# Patient Record
Sex: MALE | Race: WHITE
[De-identification: names, ages, dates, MRNs, and addresses within clinical notes are randomized per-mention and may not be internally consistent; named-entity substitution may affect disease eponyms.]

---

## 2020-04-30 ENCOUNTER — HOSPITAL ENCOUNTER (OUTPATIENT)
Dept: HOSPITAL 47 - RADECHMAIN | Age: 50
Discharge: HOME | End: 2020-04-30
Attending: FAMILY MEDICINE
Payer: COMMERCIAL

## 2020-04-30 DIAGNOSIS — I27.20: ICD-10-CM

## 2020-04-30 DIAGNOSIS — I07.1: Primary | ICD-10-CM

## 2020-04-30 DIAGNOSIS — R06.00: ICD-10-CM

## 2020-04-30 PROCEDURE — 93017 CV STRESS TEST TRACING ONLY: CPT

## 2020-04-30 PROCEDURE — 93306 TTE W/DOPPLER COMPLETE: CPT

## 2020-04-30 NOTE — P.STRESS
- Stress Test Note


Stress Test Results/Findings: 


Exam Performed:  stress test


Exam Date: 04/30/20


Reason for Exam: DYSPNEA





Height: 5 ft 8 in


Weight: 100 kg


Protocol: NASH


Stage: 2


Duration of Exercise: 6:00





Resting Heart Rate: 78


Resting Blood Pressure: 149/103


Maximum Achieved Heart Rate: 153


Maximum Achieved Blood Pressure: 216/88


85% PMHR: 146


100% PMHR: 172


METS: 7.1


Technologist Comment: 





Stress Test Results/Findings: 


This is a 50-year-old gentleman with history of hypertension, COPD, being 

evaluated for shortness of breath.





Stress data: Baseline EKG showed sinus rhythm with normal MO interval and QRS 

duration.  Blood pressure at rest is 149/103 with pulse rate of 78.  Patient 

walked on a Nash protocol for 6 minutes achieving a maximum heart rate of 153 

with a blood pressure of 216/88.  EKGs taken during and after exercise did not 

reveal any significant changes from baseline.





Final impression: #1.  Limited exercise capacity #2 negative stress test.  #3.  

Patient did not experience any chest pain .  #4.  No arrhythmias detected.

## 2020-04-30 NOTE — ECHOF
Referral Reason:R06.00 Dyspnea, unspecified



MEASUREMENTS

--------

HEIGHT: 172.7 cm

WEIGHT: 97.5 kg

BP: 

RVIDd:   3.9 cm     (< 3.3)

IVSd:   1.7 cm     (0.6 - 1.1)

LVIDd:   4.0 cm     (3.9 - 5.3)

LVPWd:   1.7 cm     (0.6 - 1.1)

IVSs:   2.5 cm

LVIDs:   2.4 cm

LVPWs:   2.3 cm

LAESV Index (A-L):   26.40 ml/m

Ao Diam:   3.4 cm     (2.0 - 3.7)

AV Cusp:   2.4 cm     (1.5 - 2.6)

MV EXCURSION:   15.488 mm     (> 18.000)

MV EF SLOPE:   52 mm/s     (70 - 150)

EPSS:   0.1 cm

MV E Gal:   0.76 m/s

MV DecT:   217 ms

MV A Gal:   0.66 m/s

MV E/A Ratio:   1.14 

RAP:   5.00 mmHg

RVSP:   35.33 mmHg







FINDINGS

--------

Sinus rhythm.

This was a technically adequate study.

The left ventricular size is normal.   There is moderate concentric left ventricular hypertrophy.   O
verall left ventricular systolic function is normal with, an EF between 60 - 65 %.   The diastolic fi
lling pattern is normal for the age of the patient 12.02.

The right ventricle is moderately enlarged.

Normal LA  size by volume 22+/-6 ml/m2.

The right atrium is mildly enlarged.

Interatrial and interventricular septum intact.

The aortic valve is trileaflet and appears structurally normal.   There is mild aortic valve sclerosi
s.   There is no evidence of aortic regurgitation.   There is no evidence of aortic stenosis.

There is trace mitral regurgitation.

Mild tricuspid regurgitation present.   There is borderline pulmonary hypertension.   The right ventr
icular systolic pressure, as measured by Doppler, is 35.33mmHg.

There is no pulmonic regurgitation present.

The aortic root size is normal.

IVC Not well visulized.

There is no pericardial effusion.



CONCLUSIONS

--------

1. Sinus rhythm.

2. This was a technically adequate study.

3. The left ventricular size is normal.

4. There is moderate concentric left ventricular hypertrophy.

5. Overall left ventricular systolic function is normal with, an EF between 60 - 65 %.

6. The diastolic filling pattern is normal for the age of the patient 12.02

7. The right ventricle is moderately enlarged.

8. Normal LA size by volume 22+/-6 ml/m2.

9. The right atrium is mildly enlarged.

10. Interatrial and interventricular septum intact.

11. The aortic valve is trileaflet and appears structurally normal.

12. There is mild aortic valve sclerosis.

13. There is no evidence of aortic regurgitation.

14. There is no evidence of aortic stenosis.

15. There is trace mitral regurgitation.

16. Mild tricuspid regurgitation present.

17. There is borderline pulmonary hypertension.

18. The right ventricular systolic pressure, as measured by Doppler, is 35.33mmHg.

19. There is no pulmonic regurgitation present.

20. The aortic root size is normal.

21. IVC Not well visulized.

22. There is no pericardial effusion.





SONOGRAPHER: Gena Morales RDCS

## 2020-08-10 ENCOUNTER — HOSPITAL ENCOUNTER (OUTPATIENT)
Dept: HOSPITAL 47 - RADUSWWP | Age: 50
Discharge: HOME | End: 2020-08-10
Attending: FAMILY MEDICINE
Payer: COMMERCIAL

## 2020-08-10 DIAGNOSIS — R97.20: Primary | ICD-10-CM

## 2020-08-10 PROCEDURE — 76872 US TRANSRECTAL: CPT

## 2020-08-10 NOTE — US
EXAMINATION TYPE: US prostate transrectal

 

DATE OF EXAM: 8/10/2020

 

COMPARISON: NONE

 

CLINICAL HISTORY: R97.20 Raised PSA. Elevated PSA, pt also states urinary frequency at night, and dif
ficulty emptying bladder

 

This examination was performed using the transrectal probe. 

 

EXAM MEASUREMENTS:

 

Gland Size: 4.8 x 2.9 x 4.1 cm

Volume: 29.7 ml

Predicted PSA:  3.6

Actual PSA (if available):7.6

 

**Predicted PSA wnl (much lower than actual PSA), no peripheral lesion could be appreciated

 

Seminal vesicles are unremarkable initially. Prostate gland measures upper limits of normal in size w
ith some central calcifications. No suspicious hypoechoic nodules identified.

 

IMPRESSION:  As above. Actual PSA out of proportion to predicted PSA. Advise urology referral. Consid
er ultrasound guided random sampling or MRI evaluation.

 

 

Predicted PSA = volume x 0.12 ng/ml

Calculated Volume = 0.5236 x L x W x H

## 2020-10-07 ENCOUNTER — HOSPITAL ENCOUNTER (EMERGENCY)
Dept: HOSPITAL 47 - EC | Age: 50
Discharge: HOME | End: 2020-10-07
Payer: COMMERCIAL

## 2020-10-07 VITALS — HEART RATE: 86 BPM | SYSTOLIC BLOOD PRESSURE: 132 MMHG | DIASTOLIC BLOOD PRESSURE: 76 MMHG | RESPIRATION RATE: 16 BRPM

## 2020-10-07 VITALS — TEMPERATURE: 98.1 F

## 2020-10-07 DIAGNOSIS — Z87.891: ICD-10-CM
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
## 2020-10-07 DIAGNOSIS — S81.811A: Primary | ICD-10-CM

## 2020-10-07 PROCEDURE — 99282 EMERGENCY DEPT VISIT SF MDM: CPT

## 2020-10-07 PROCEDURE — 12002 RPR S/N/AX/GEN/TRNK2.6-7.5CM: CPT

## 2020-10-07 NOTE — ED
Wound/Laceration HPI





- General


Chief Complaint: Wound/Laceration


Stated Complaint: leg lac


Time Seen by Provider: 10/07/20 13:31


Source: patient


Mode of arrival: ambulatory


Limitations: no limitations





- History of Present Illness


Initial Comments: 





Patient is a 42-year-old male presenting to the emergency department with chief 

complaint of laceration.  Patient states that he accidentally lacerated the 

lateral aspect of his right leg using a knife.  States his tetanus is 

up-to-date.  No blood thinners.  States the pain is minimal.  This occurred 

about one hour prior to arrival.  No alleviating or aggravating factors.  No 

numbness or tingling.





- Related Data


                                    Allergies











Allergy/AdvReac Type Severity Reaction Status Date / Time


 


No Known Allergies Allergy   Verified 10/07/20 13:26














Review of Systems


ROS Statement: 


Those systems with pertinent positive or pertinent negative responses have been 

documented in the HPI.





ROS Other: All systems not noted in ROS Statement are negative.





Past Medical History


Past Medical History: No Reported History


History of Any Multi-Drug Resistant Organisms: None Reported


Past Surgical History: Orthopedic Surgery


Additional Past Surgical History / Comment(s): rt shoulder


Past Psychological History: No Psychological Hx Reported


Smoking Status: Former smoker


Past Alcohol Use History: Occasional


Past Drug Use History: None Reported





General Exam


Limitations: no limitations


General appearance: alert, in no apparent distress


Head exam: Present: atraumatic, normocephalic, normal inspection


Eye exam: Present: normal appearance, PERRL, EOMI


Pupils: Present: normal accommodation


ENT exam: Present: normal exam, normal oropharynx, mucous membranes moist


Neck exam: Present: normal inspection, full ROM


Respiratory exam: Present: normal lung sounds bilaterally.  Absent: respiratory 

distress, wheezes


Cardiovascular Exam: Present: regular rate, normal rhythm, normal heart sounds


Extremities exam: Present: full ROM, normal capillary refill, other (+2 ulnar 

and radial pulses bilateral.  +2 dorsalis pedis and posterior tibials 

bilateral.).  Absent: normal inspection (5 similar laceration on the lateral 

aspect of her right lower extremity.), tenderness, pedal edema, joint swelling, 

calf tenderness


Back exam: Present: normal inspection, full ROM.  Absent: tenderness, CVA 

tenderness (R), CVA tenderness (L)


Neurological exam: Present: alert, oriented X3, normal gait


Psychiatric exam: Present: normal affect, normal mood


Skin exam: Present: warm, dry, intact, normal color





Course


                                   Vital Signs











  10/07/20 10/07/20





  13:23 14:32


 


Temperature 98.1 F 


 


Pulse Rate 83 86


 


Respiratory 18 16





Rate  


 


Blood Pressure 114/77 132/76


 


O2 Sat by Pulse 98 99





Oximetry  














Procedures





- Laceration


  ** Laceration #1


Consent Obtained: verbal consent


Indication: laceration


Site: lower extremity (Right leg)


Size (cm): 5


Description: linear


Depth: simple, single layer


Sedation/Analgesia: none


Anesthetic Used: lidocaine 1%


Anesthesia Technique: local infiltration


Amount (mls): 5


Pre-repair: irrigated extensively, deep structures intact


Type of Sutures: nylon


Size of Sutures: 4-0


Number of Sutures: 6


Technique: simple, interrupted


Patient Tolerated Procedure: well, no complications





Medical Decision Making





- Medical Decision Making





Patient is a 42-year-old male presents emergency Department with chief complaint

of laceration.  Laceration site was thoroughly irrigated and repaired with 5 

sutures.  Patient tolerate the procedure well.  His tetanus is up-to-date.  

Return parameters discussed with patient was understanding and agreeable.  Case 

discussed with physician.





Disposition


Clinical Impression: 


 Laceration





Disposition: HOME SELF-CARE


Condition: Stable


Instructions (If sedation given, give patient instructions):  Care For Your 

Stitches (DC), Laceration (DC)


Additional Instructions: 


Please return to the emergency room in 12-14 days to have sutures removed.  

Please watch for any signs of infection which may include increased pain, 

swelling, redness, fever or chills.  Please return to emergency room for any 

signs of infection do occur.  Please use clean soap and water over the area to 

prevent scabbing over your stitches.  Please leave wound covered for the first 

24-48 hours and then leave wound open to air.  Please return to the emergency 

room for any other concerns.


Is patient prescribed a controlled substance at d/c from ED?: No


Referrals: 


Stephen Arriaza MD [Primary Care Provider] - 1-2 days


Time of Disposition: 14:27

## 2023-03-24 ENCOUNTER — HOSPITAL ENCOUNTER (OUTPATIENT)
Dept: HOSPITAL 47 - LABWHC1 | Age: 53
Discharge: HOME | End: 2023-03-24
Attending: FAMILY MEDICINE
Payer: COMMERCIAL

## 2023-03-24 DIAGNOSIS — Z00.01: Primary | ICD-10-CM

## 2023-03-24 LAB
ALBUMIN SERPL-MCNC: 4.4 G/DL (ref 3.8–4.9)
ALBUMIN/GLOB SERPL: 1.73 G/DL (ref 1.6–3.17)
ALP SERPL-CCNC: 124 U/L (ref 41–126)
ALT SERPL-CCNC: 44 U/L (ref 10–49)
ANION GAP SERPL CALC-SCNC: 8.7 MMOL/L (ref 10–18)
AST SERPL-CCNC: 23 U/L (ref 14–35)
BUN SERPL-SCNC: 18.9 MG/DL (ref 9–27)
BUN/CREAT SERPL: 23.86 RATIO (ref 12–20)
CALCIUM SPEC-MCNC: 9.3 MG/DL (ref 8.7–10.3)
CHLORIDE SERPL-SCNC: 106 MMOL/L (ref 96–109)
CHOLEST SERPL-MCNC: 159 MG/DL (ref 0–200)
CO2 SERPL-SCNC: 25.9 MMOL/L (ref 20–27.5)
ERYTHROCYTE [DISTWIDTH] IN BLOOD BY AUTOMATED COUNT: 4.9 X 10*6/UL (ref 4.4–5.6)
ERYTHROCYTE [DISTWIDTH] IN BLOOD: 13.5 % (ref 11.5–14.5)
GLOBULIN SER CALC-MCNC: 2.6 G/DL (ref 1.6–3.3)
GLUCOSE SERPL-MCNC: 125 MG/DL (ref 70–110)
HCT VFR BLD AUTO: 45.4 % (ref 39.6–50)
HDLC SERPL-MCNC: 35 MG/DL (ref 40–60)
HGB BLD-MCNC: 14.4 G/DL (ref 13–17)
LDLC SERPL CALC-MCNC: 104.5 MG/DL (ref 0–131)
MCH RBC QN AUTO: 29.4 PG (ref 27–32)
MCHC RBC AUTO-ENTMCNC: 31.7 G/DL (ref 32–37)
MCV RBC AUTO: 92.7 FL (ref 80–97)
NRBC BLD AUTO-RTO: 0 /100 WBCS (ref 0–0)
PLATELET # BLD AUTO: 369 X 10*3/UL (ref 140–440)
POTASSIUM SERPL-SCNC: 4.4 MMOL/L (ref 3.5–5.5)
PROT SERPL-MCNC: 7 G/DL (ref 6.2–8.2)
PSA SERPL-MCNC: 1.4 NG/ML (ref 0–3.5)
SODIUM SERPL-SCNC: 140 MMOL/L (ref 135–145)
TRIGL SERPL-MCNC: 97.6 MG/DL (ref 0–149)
VLDLC SERPL CALC-MCNC: 19.52 MG/DL (ref 5–40)
WBC # BLD AUTO: 7.54 X 10*3/UL (ref 4.5–10)

## 2023-03-24 PROCEDURE — 36415 COLL VENOUS BLD VENIPUNCTURE: CPT

## 2023-03-24 PROCEDURE — 85027 COMPLETE CBC AUTOMATED: CPT

## 2023-03-24 PROCEDURE — 80061 LIPID PANEL: CPT

## 2023-03-24 PROCEDURE — 84153 ASSAY OF PSA TOTAL: CPT

## 2023-03-24 PROCEDURE — 80053 COMPREHEN METABOLIC PANEL: CPT

## 2024-08-07 ENCOUNTER — HOSPITAL ENCOUNTER (INPATIENT)
Dept: HOSPITAL 47 - 3SCARD | Age: 54
LOS: 2 days | Discharge: HOME | DRG: 282 | End: 2024-08-09
Attending: HOSPITALIST | Admitting: HOSPITALIST
Payer: COMMERCIAL

## 2024-08-07 DIAGNOSIS — I10: ICD-10-CM

## 2024-08-07 DIAGNOSIS — I21.4: Primary | ICD-10-CM

## 2024-08-07 DIAGNOSIS — E78.5: ICD-10-CM

## 2024-08-07 DIAGNOSIS — J45.909: ICD-10-CM

## 2024-08-07 PROCEDURE — 84484 ASSAY OF TROPONIN QUANT: CPT

## 2024-08-07 PROCEDURE — 93306 TTE W/DOPPLER COMPLETE: CPT

## 2024-08-07 PROCEDURE — 93005 ELECTROCARDIOGRAM TRACING: CPT

## 2024-08-07 PROCEDURE — 99285 EMERGENCY DEPT VISIT HI MDM: CPT

## 2024-08-07 PROCEDURE — 83880 ASSAY OF NATRIURETIC PEPTIDE: CPT

## 2024-08-07 PROCEDURE — 96374 THER/PROPH/DIAG INJ IV PUSH: CPT

## 2024-08-07 PROCEDURE — 92978 ENDOLUMINL IVUS OCT C 1ST: CPT

## 2024-08-07 PROCEDURE — 85610 PROTHROMBIN TIME: CPT

## 2024-08-07 PROCEDURE — 93458 L HRT ARTERY/VENTRICLE ANGIO: CPT

## 2024-08-07 PROCEDURE — 80053 COMPREHEN METABOLIC PANEL: CPT

## 2024-08-07 PROCEDURE — 85379 FIBRIN DEGRADATION QUANT: CPT

## 2024-08-07 PROCEDURE — 93799 UNLISTED CV SVC/PROCEDURE: CPT

## 2024-08-07 PROCEDURE — 85730 THROMBOPLASTIN TIME PARTIAL: CPT

## 2024-08-07 PROCEDURE — 83735 ASSAY OF MAGNESIUM: CPT

## 2024-08-07 PROCEDURE — 71046 X-RAY EXAM CHEST 2 VIEWS: CPT

## 2024-08-07 PROCEDURE — 85025 COMPLETE CBC W/AUTO DIFF WBC: CPT

## 2024-08-07 PROCEDURE — 84100 ASSAY OF PHOSPHORUS: CPT

## 2024-08-08 PROCEDURE — B2111ZZ FLUOROSCOPY OF MULTIPLE CORONARY ARTERIES USING LOW OSMOLAR CONTRAST: ICD-10-PCS

## 2024-08-08 PROCEDURE — 4A023N7 MEASUREMENT OF CARDIAC SAMPLING AND PRESSURE, LEFT HEART, PERCUTANEOUS APPROACH: ICD-10-PCS

## 2024-08-08 RX ADMIN — IOPAMIDOL ONE ML: 755 INJECTION, SOLUTION INTRAVENOUS at 13:15

## 2024-09-17 NOTE — XR
Site ID  [facility]  

 

Patient  Francisco Ewing L       

ID  [id number]      

  1970  Age/Gender:  54Y, M  

Order #  N/[id number]      

Accession #  [id number]      

Procedure  XR chest 2V      

Date  2024 2:59:00 PM      

 

EXAMINATION TYPE: Chest X-ray 2 Views

 

DATE OF EXAM: 2024 7:23 PM

 

COMPARISON: Chest radiographs from 1/3/2011, CT chest 2015

 

TECHNIQUE: Chest X-ray 2 Views Frontal and lateral views of the chest. Delayed interpretation due to 
institution cyber attack.

 

CLINICAL INDICATION: Male, 54 year old with history of chest pain; 

 

FINDINGS: 

Lungs/Pleura: There is no evidence of pleural effusion, focal consolidation, or pneumothorax.  

Pulmonary vascularity: Unremarkable.

Heart/mediastinum: Cardiomediastinal silhouette is unremarkable.

Musculoskeletal: No acute osseous pathology.

 

IMPRESSION: 

No acute cardiopulmonary disease/process.